# Patient Record
Sex: FEMALE | Race: BLACK OR AFRICAN AMERICAN | NOT HISPANIC OR LATINO | URBAN - METROPOLITAN AREA
[De-identification: names, ages, dates, MRNs, and addresses within clinical notes are randomized per-mention and may not be internally consistent; named-entity substitution may affect disease eponyms.]

---

## 2020-07-25 ENCOUNTER — TELEPHONE ENCOUNTER (OUTPATIENT)
Dept: URBAN - METROPOLITAN AREA CLINIC 13 | Facility: CLINIC | Age: 65
End: 2020-07-25

## 2020-07-25 RX ORDER — ESOMEPRAZOLE MAGNESIUM 40 MG
TAKE 1 CAPSULE DAILY CAPSULE,DELAYED RELEASE (ENTERIC COATED) ORAL
Qty: 30 | Refills: 6 | OUTPATIENT
End: 2017-07-06

## 2020-07-25 RX ORDER — IBUPROFEN 800 MG/1
TAKE 1 TABLET DAILY TABLET ORAL
Refills: 0 | OUTPATIENT
End: 2013-08-05

## 2020-07-25 RX ORDER — FAMOTIDINE 40 MG/1
TAKE 1 TABLET AT BEDTIME TABLET ORAL
Qty: 90 | Refills: 3 | OUTPATIENT
Start: 2018-03-23 | End: 2018-09-28

## 2020-07-25 RX ORDER — AMITRIPTYLINE HYDROCHLORIDE 10 MG/1
TAKE 1 TABLET AT BEDTIME TABLET, FILM COATED ORAL
Qty: 30 | Refills: 3 | OUTPATIENT
Start: 2018-12-31 | End: 2019-07-01

## 2020-07-25 RX ORDER — ATENOLOL 50 MG/1
TAKE 1 TABLET DAILY TABLET ORAL
Refills: 0 | OUTPATIENT
End: 2013-05-08

## 2020-07-25 RX ORDER — LIRAGLUTIDE 6 MG/ML
INJECT UNIT  EVERY 2 DAYS INJECTION, SOLUTION SUBCUTANEOUS
Refills: 0 | OUTPATIENT
End: 2018-09-28

## 2020-07-25 RX ORDER — PANTOPRAZOLE SODIUM 40 MG/1
TAKE ONE TABLET BY MOUTH TWICE DAILY 30 MINUTES BEFORE BREAKFAST AND DINNER TABLET, DELAYED RELEASE ORAL
Qty: 60 | Refills: 3 | OUTPATIENT
End: 2019-07-01

## 2020-07-25 RX ORDER — SUCRALFATE 1 G/10ML
TAKE 10 ML 4 TIMES DAILY BEFORE EACH MEAL AND AT BEDTIME SUSPENSION ORAL
Qty: 1 | Refills: 10 | OUTPATIENT
Start: 2018-12-31 | End: 2019-07-01

## 2020-07-26 ENCOUNTER — TELEPHONE ENCOUNTER (OUTPATIENT)
Dept: URBAN - METROPOLITAN AREA CLINIC 13 | Facility: CLINIC | Age: 65
End: 2020-07-26

## 2020-07-26 RX ORDER — AMITRIPTYLINE HYDROCHLORIDE 10 MG/1
TAKE 1 TABLET BY MOUTH AT BEDTIME TABLET, FILM COATED ORAL
Qty: 90 | Refills: 1 | Status: ACTIVE | COMMUNITY
Start: 2019-07-01

## 2020-07-26 RX ORDER — AMLODIPINE BESYLATE 5 MG/1
TAKE 1 TABLET DAILY AS DIRECTED TABLET ORAL
Refills: 0 | Status: ACTIVE | COMMUNITY

## 2020-07-26 RX ORDER — DEXTROSE 4 G
TAKE 1 TABLET DAILY AS NEEDED TABLET,CHEWABLE ORAL
Refills: 0 | Status: ACTIVE | COMMUNITY

## 2020-07-26 RX ORDER — HYDROCODONE BITARTRATE AND ACETAMINOPHEN 10; 325 MG/1; MG/1
TAKE 1 TABLET EVERY 4 HOURS AS NEEDED TABLET ORAL
Refills: 0 | Status: ACTIVE | COMMUNITY

## 2020-07-26 RX ORDER — TELMISARTAN AND HYDROCHLOROTHIAZIDE 80; 25 MG/1; MG/1
TAKE 1 TABLET DAILY TABLET ORAL
Refills: 0 | Status: ACTIVE | COMMUNITY

## 2020-07-26 RX ORDER — MULTIVITAMIN
TAKE 1 TABLET DAILY TABLET ORAL
Refills: 0 | Status: ACTIVE | COMMUNITY

## 2023-10-17 ENCOUNTER — OFFICE VISIT (OUTPATIENT)
Dept: URBAN - METROPOLITAN AREA CLINIC 113 | Facility: CLINIC | Age: 68
End: 2023-10-17
Payer: MEDICARE

## 2023-10-17 ENCOUNTER — LAB OUTSIDE AN ENCOUNTER (OUTPATIENT)
Dept: URBAN - METROPOLITAN AREA CLINIC 113 | Facility: CLINIC | Age: 68
End: 2023-10-17

## 2023-10-17 VITALS
HEIGHT: 64 IN | TEMPERATURE: 98 F | WEIGHT: 204 LBS | HEART RATE: 50 BPM | SYSTOLIC BLOOD PRESSURE: 143 MMHG | DIASTOLIC BLOOD PRESSURE: 77 MMHG | BODY MASS INDEX: 34.83 KG/M2

## 2023-10-17 DIAGNOSIS — K21.9 GERD WITHOUT ESOPHAGITIS: ICD-10-CM

## 2023-10-17 DIAGNOSIS — Z12.11 COLON CANCER SCREENING: ICD-10-CM

## 2023-10-17 PROBLEM — 266435005: Status: ACTIVE | Noted: 2023-10-17

## 2023-10-17 PROCEDURE — 99202 OFFICE O/P NEW SF 15 MIN: CPT | Performed by: INTERNAL MEDICINE

## 2023-10-17 RX ORDER — TELMISARTAN AND HYDROCHLOROTHIAZIDE 80; 25 MG/1; MG/1
TAKE 1 TABLET DAILY TABLET ORAL
Refills: 0 | Status: ON HOLD | COMMUNITY

## 2023-10-17 RX ORDER — FEXOFENADINE HYDROCHLORIDE 180 MG/1
1 TABLET SWALLOW WHOLE WITH WATER; DO NOT TAKE WITH FRUIT JUICES TABLET ORAL ONCE A DAY
Status: ACTIVE | COMMUNITY

## 2023-10-17 RX ORDER — SIMVASTATIN 10 MG
2 TABLETS IN THE EVENING TABLET ORAL ONCE A DAY
Status: ACTIVE | COMMUNITY

## 2023-10-17 RX ORDER — TELMISARTAN 80 MG/1
1 TABLET TABLET ORAL ONCE A DAY
Status: ACTIVE | COMMUNITY

## 2023-10-17 RX ORDER — AMLODIPINE BESYLATE 5 MG/1
TAKE 1 TABLET DAILY AS DIRECTED TABLET ORAL
Refills: 0 | Status: ACTIVE | COMMUNITY

## 2023-10-17 RX ORDER — AMITRIPTYLINE HYDROCHLORIDE 10 MG/1
TAKE 1 TABLET BY MOUTH AT BEDTIME TABLET, FILM COATED ORAL
Qty: 90 | Refills: 1 | Status: ACTIVE | COMMUNITY
Start: 2019-07-01

## 2023-10-17 RX ORDER — PANTOPRAZOLE SODIUM 40 MG/1
1 TABLET TABLET, DELAYED RELEASE ORAL ONCE A DAY
Status: ACTIVE | COMMUNITY

## 2023-10-17 RX ORDER — DEXTROSE 4 G
TAKE 1 TABLET DAILY AS NEEDED TABLET,CHEWABLE ORAL
Refills: 0 | Status: ACTIVE | COMMUNITY

## 2023-10-17 RX ORDER — GABAPENTIN 600 MG/1
1 TABLET TABLET, FILM COATED ORAL ONCE A DAY
Status: ACTIVE | COMMUNITY

## 2023-10-17 RX ORDER — MULTIVITAMIN
TAKE 1 TABLET DAILY TABLET ORAL
Refills: 0 | Status: ACTIVE | COMMUNITY

## 2023-10-17 RX ORDER — EPINEPHRINE 0.3 MG/.3ML
AS DIRECTED INJECTION INTRAMUSCULAR; SUBCUTANEOUS
Status: ACTIVE | COMMUNITY

## 2023-10-17 RX ORDER — MONTELUKAST 10 MG/1
1 TABLET TABLET, FILM COATED ORAL ONCE A DAY
Status: ACTIVE | COMMUNITY

## 2023-10-17 RX ORDER — AZELASTINE HYDROCHLORIDE 137 UG/1
1 PUFF IN EACH NOSTRIL SPRAY, METERED NASAL TWICE A DAY
Status: ACTIVE | COMMUNITY

## 2023-10-17 RX ORDER — HYDROCODONE BITARTRATE AND ACETAMINOPHEN 10; 325 MG/1; MG/1
TAKE 1 TABLET EVERY 4 HOURS AS NEEDED TABLET ORAL
Refills: 0 | Status: ACTIVE | COMMUNITY

## 2023-10-17 NOTE — HPI-TODAY'S VISIT:
The patient is a very pleasant 67-year-old female who returns after long interval absence.  The patient was initially seen in February 2013 for dysphagia and acid reflux.  She was last seen in the office in 2019 for belching and acid reflux. Last upper endoscopy was in August 2019 this revealed a small hiatal hernia but otherwise unremarkable exam.  Exam was for nausea and vomiting at that time.  Last colonoscopy was in September 2013.  This was for history of colon polyps.  Exam was without difficulty.  Patient had diverticulosis but was otherwise unremarkable exam and she was recommended a 10-year follow-up. Referring records were reviewed.  Laboratory testing from August of this year revealed a normal CBC, normal CMP other than creatinine of 1.3.  Normal TSH normal T4 normal vitamin B12 normal folate The patient states that she comes in now for her screening colonoscopy.  She states over the last 4 years since have seen her she is remained in excellent health.  She does state though recently she has had problems of low blood sugar and fainting spells and is undergoing neurological evaluation.  She states there is some concern for possible stroke or TIA.  She states so far MRI and CT scan of the head have been unremarkable.

## 2023-11-29 ENCOUNTER — TELEPHONE ENCOUNTER (OUTPATIENT)
Dept: URBAN - METROPOLITAN AREA CLINIC 113 | Facility: CLINIC | Age: 68
End: 2023-11-29

## 2024-01-16 ENCOUNTER — OUT OF OFFICE VISIT (OUTPATIENT)
Dept: URBAN - METROPOLITAN AREA SURGERY CENTER 25 | Facility: SURGERY CENTER | Age: 69
End: 2024-01-16
Payer: MEDICARE

## 2024-01-16 ENCOUNTER — CLAIMS CREATED FROM THE CLAIM WINDOW (OUTPATIENT)
Dept: URBAN - METROPOLITAN AREA CLINIC 4 | Facility: CLINIC | Age: 69
End: 2024-01-16
Payer: MEDICARE

## 2024-01-16 DIAGNOSIS — D12.5 ADENOMATOUS POLYP OF SIGMOID COLON: ICD-10-CM

## 2024-01-16 DIAGNOSIS — Z12.11 COLON CANCER SCREENING (HIGH RISK): ICD-10-CM

## 2024-01-16 DIAGNOSIS — Z12.11 COLON CANCER SCREENING: ICD-10-CM

## 2024-01-16 DIAGNOSIS — K63.89 APPENDICITIS EPIPLOICA: ICD-10-CM

## 2024-01-16 DIAGNOSIS — K63.89 OTHER SPECIFIED DISEASES OF INTESTINE: ICD-10-CM

## 2024-01-16 DIAGNOSIS — K57.30 COLON, DIVERTICULOSIS: ICD-10-CM

## 2024-01-16 PROCEDURE — 88305 TISSUE EXAM BY PATHOLOGIST: CPT | Performed by: PATHOLOGY

## 2024-01-16 PROCEDURE — 00811 ANES LWR INTST NDSC NOS: CPT | Performed by: ANESTHESIOLOGY

## 2024-01-16 PROCEDURE — 45385 COLONOSCOPY W/LESION REMOVAL: CPT | Performed by: INTERNAL MEDICINE

## 2024-01-16 PROCEDURE — G8907 PT DOC NO EVENTS ON DISCHARG: HCPCS | Performed by: INTERNAL MEDICINE

## 2024-01-16 RX ORDER — HYDROCODONE BITARTRATE AND ACETAMINOPHEN 10; 325 MG/1; MG/1
TAKE 1 TABLET EVERY 4 HOURS AS NEEDED TABLET ORAL
Refills: 0 | Status: ACTIVE | COMMUNITY

## 2024-01-16 RX ORDER — PANTOPRAZOLE SODIUM 40 MG/1
1 TABLET TABLET, DELAYED RELEASE ORAL ONCE A DAY
Status: ACTIVE | COMMUNITY

## 2024-01-16 RX ORDER — DEXTROSE 4 G
TAKE 1 TABLET DAILY AS NEEDED TABLET,CHEWABLE ORAL
Refills: 0 | Status: ACTIVE | COMMUNITY

## 2024-01-16 RX ORDER — SIMVASTATIN 10 MG
2 TABLETS IN THE EVENING TABLET ORAL ONCE A DAY
Status: ACTIVE | COMMUNITY

## 2024-01-16 RX ORDER — TELMISARTAN 80 MG/1
1 TABLET TABLET ORAL ONCE A DAY
Status: ACTIVE | COMMUNITY

## 2024-01-16 RX ORDER — FEXOFENADINE HYDROCHLORIDE 180 MG/1
1 TABLET SWALLOW WHOLE WITH WATER; DO NOT TAKE WITH FRUIT JUICES TABLET ORAL ONCE A DAY
Status: ACTIVE | COMMUNITY

## 2024-01-16 RX ORDER — TELMISARTAN AND HYDROCHLOROTHIAZIDE 80; 25 MG/1; MG/1
TAKE 1 TABLET DAILY TABLET ORAL
Refills: 0 | Status: ON HOLD | COMMUNITY

## 2024-01-16 RX ORDER — MULTIVITAMIN
TAKE 1 TABLET DAILY TABLET ORAL
Refills: 0 | Status: ACTIVE | COMMUNITY

## 2024-01-16 RX ORDER — MONTELUKAST 10 MG/1
1 TABLET TABLET, FILM COATED ORAL ONCE A DAY
Status: ACTIVE | COMMUNITY

## 2024-01-16 RX ORDER — AZELASTINE HYDROCHLORIDE 137 UG/1
1 PUFF IN EACH NOSTRIL SPRAY, METERED NASAL TWICE A DAY
Status: ACTIVE | COMMUNITY

## 2024-01-16 RX ORDER — AMLODIPINE BESYLATE 5 MG/1
TAKE 1 TABLET DAILY AS DIRECTED TABLET ORAL
Refills: 0 | Status: ACTIVE | COMMUNITY

## 2024-01-16 RX ORDER — GABAPENTIN 600 MG/1
1 TABLET TABLET, FILM COATED ORAL ONCE A DAY
Status: ACTIVE | COMMUNITY

## 2024-01-16 RX ORDER — EPINEPHRINE 0.3 MG/.3ML
AS DIRECTED INJECTION INTRAMUSCULAR; SUBCUTANEOUS
Status: ACTIVE | COMMUNITY

## 2024-01-16 RX ORDER — AMITRIPTYLINE HYDROCHLORIDE 10 MG/1
TAKE 1 TABLET BY MOUTH AT BEDTIME TABLET, FILM COATED ORAL
Qty: 90 | Refills: 1 | Status: ACTIVE | COMMUNITY
Start: 2019-07-01

## 2024-05-22 ENCOUNTER — OFFICE VISIT (OUTPATIENT)
Dept: URBAN - METROPOLITAN AREA CLINIC 113 | Facility: CLINIC | Age: 69
End: 2024-05-22
Payer: MEDICARE

## 2024-05-22 ENCOUNTER — DASHBOARD ENCOUNTERS (OUTPATIENT)
Age: 69
End: 2024-05-22

## 2024-05-22 VITALS
SYSTOLIC BLOOD PRESSURE: 145 MMHG | BODY MASS INDEX: 34.37 KG/M2 | RESPIRATION RATE: 18 BRPM | WEIGHT: 201.3 LBS | HEART RATE: 60 BPM | TEMPERATURE: 98.1 F | DIASTOLIC BLOOD PRESSURE: 56 MMHG | HEIGHT: 64 IN

## 2024-05-22 DIAGNOSIS — R19.06 EPIGASTRIC FULLNESS: ICD-10-CM

## 2024-05-22 DIAGNOSIS — R68.81 EARLY SATIETY: ICD-10-CM

## 2024-05-22 DIAGNOSIS — R42 DIZZINESS: ICD-10-CM

## 2024-05-22 DIAGNOSIS — R11.2 NAUSEA AND VOMITING, UNSPECIFIED VOMITING TYPE: ICD-10-CM

## 2024-05-22 PROCEDURE — 99214 OFFICE O/P EST MOD 30 MIN: CPT

## 2024-05-22 RX ORDER — GABAPENTIN 600 MG/1
1 TABLET TABLET, FILM COATED ORAL ONCE A DAY
Status: ON HOLD | COMMUNITY

## 2024-05-22 RX ORDER — TELMISARTAN AND HYDROCHLOROTHIAZIDE 80; 25 MG/1; MG/1
TAKE 1 TABLET DAILY TABLET ORAL
Refills: 0 | Status: ON HOLD | COMMUNITY

## 2024-05-22 RX ORDER — EPINEPHRINE 0.3 MG/.3ML
AS DIRECTED INJECTION INTRAMUSCULAR; SUBCUTANEOUS
Status: ACTIVE | COMMUNITY

## 2024-05-22 RX ORDER — TELMISARTAN 80 MG/1
1 TABLET TABLET ORAL ONCE A DAY
Status: ACTIVE | COMMUNITY

## 2024-05-22 RX ORDER — DEXTROSE 4 G
TAKE 1 TABLET DAILY AS NEEDED TABLET,CHEWABLE ORAL
Refills: 0 | Status: ACTIVE | COMMUNITY

## 2024-05-22 RX ORDER — AZELASTINE 1 MG/ML
1 PUFF IN EACH NOSTRIL SPRAY, METERED NASAL TWICE A DAY
Status: ACTIVE | COMMUNITY

## 2024-05-22 RX ORDER — AMITRIPTYLINE HYDROCHLORIDE 10 MG/1
TAKE 1 TABLET BY MOUTH AT BEDTIME TABLET, FILM COATED ORAL
Qty: 90 | Refills: 1 | Status: ACTIVE | COMMUNITY
Start: 2019-07-01

## 2024-05-22 RX ORDER — SIMVASTATIN 10 MG
2 TABLETS IN THE EVENING TABLET ORAL ONCE A DAY
Status: ACTIVE | COMMUNITY

## 2024-05-22 RX ORDER — HYDROCODONE BITARTRATE AND ACETAMINOPHEN 10; 325 MG/1; MG/1
TAKE 1 TABLET EVERY 4 HOURS AS NEEDED TABLET ORAL
Refills: 0 | Status: ON HOLD | COMMUNITY

## 2024-05-22 RX ORDER — AMLODIPINE BESYLATE 5 MG/1
TAKE 1 TABLET DAILY AS DIRECTED TABLET ORAL
Refills: 0 | Status: ACTIVE | COMMUNITY

## 2024-05-22 RX ORDER — MULTIVITAMIN
TAKE 1 TABLET DAILY TABLET ORAL
Refills: 0 | Status: ACTIVE | COMMUNITY

## 2024-05-22 RX ORDER — PANTOPRAZOLE SODIUM 40 MG/1
1 TABLET TABLET, DELAYED RELEASE ORAL ONCE A DAY
Status: ACTIVE | COMMUNITY

## 2024-05-22 RX ORDER — MONTELUKAST 10 MG/1
1 TABLET TABLET, FILM COATED ORAL ONCE A DAY
Status: ACTIVE | COMMUNITY

## 2024-05-22 RX ORDER — FEXOFENADINE HYDROCHLORIDE 180 MG/1
1 TABLET SWALLOW WHOLE WITH WATER; DO NOT TAKE WITH FRUIT JUICES TABLET ORAL ONCE A DAY
Status: ACTIVE | COMMUNITY

## 2024-07-10 ENCOUNTER — CLAIMS CREATED FROM THE CLAIM WINDOW (OUTPATIENT)
Dept: URBAN - METROPOLITAN AREA SURGERY CENTER 25 | Facility: SURGERY CENTER | Age: 69
End: 2024-07-10
Payer: MEDICARE

## 2024-07-10 DIAGNOSIS — R11.0 NAUSEA: ICD-10-CM

## 2024-07-10 DIAGNOSIS — K44.9 HIATAL HERNIA: ICD-10-CM

## 2024-07-10 DIAGNOSIS — R10.13 EPIGASTRIC PAIN: ICD-10-CM

## 2024-07-10 PROCEDURE — 43235 EGD DIAGNOSTIC BRUSH WASH: CPT | Performed by: INTERNAL MEDICINE

## 2024-07-10 PROCEDURE — 00731 ANES UPR GI NDSC PX NOS: CPT | Performed by: ANESTHESIOLOGY

## 2024-07-10 PROCEDURE — 00731 ANES UPR GI NDSC PX NOS: CPT | Performed by: ANESTHESIOLOGIST ASSISTANT

## 2024-07-10 RX ORDER — FEXOFENADINE HYDROCHLORIDE 180 MG/1
1 TABLET SWALLOW WHOLE WITH WATER; DO NOT TAKE WITH FRUIT JUICES TABLET ORAL ONCE A DAY
Status: ACTIVE | COMMUNITY

## 2024-07-10 RX ORDER — GABAPENTIN 600 MG/1
1 TABLET TABLET, FILM COATED ORAL ONCE A DAY
Status: ON HOLD | COMMUNITY

## 2024-07-10 RX ORDER — EPINEPHRINE 0.3 MG/.3ML
AS DIRECTED INJECTION INTRAMUSCULAR; SUBCUTANEOUS
Status: ACTIVE | COMMUNITY

## 2024-07-10 RX ORDER — HYDROCODONE BITARTRATE AND ACETAMINOPHEN 10; 325 MG/1; MG/1
TAKE 1 TABLET EVERY 4 HOURS AS NEEDED TABLET ORAL
Refills: 0 | Status: ON HOLD | COMMUNITY

## 2024-07-10 RX ORDER — SIMVASTATIN 10 MG
2 TABLETS IN THE EVENING TABLET ORAL ONCE A DAY
Status: ACTIVE | COMMUNITY

## 2024-07-10 RX ORDER — PANTOPRAZOLE SODIUM 40 MG/1
1 TABLET TABLET, DELAYED RELEASE ORAL ONCE A DAY
Status: ACTIVE | COMMUNITY

## 2024-07-10 RX ORDER — AZELASTINE 1 MG/ML
1 PUFF IN EACH NOSTRIL SPRAY, METERED NASAL TWICE A DAY
Status: ACTIVE | COMMUNITY

## 2024-07-10 RX ORDER — MULTIVITAMIN
TAKE 1 TABLET DAILY TABLET ORAL
Refills: 0 | Status: ACTIVE | COMMUNITY

## 2024-07-10 RX ORDER — MONTELUKAST 10 MG/1
1 TABLET TABLET, FILM COATED ORAL ONCE A DAY
Status: ACTIVE | COMMUNITY

## 2024-07-10 RX ORDER — AMLODIPINE BESYLATE 5 MG/1
TAKE 1 TABLET DAILY AS DIRECTED TABLET ORAL
Refills: 0 | Status: ACTIVE | COMMUNITY

## 2024-07-10 RX ORDER — AMITRIPTYLINE HYDROCHLORIDE 10 MG/1
TAKE 1 TABLET BY MOUTH AT BEDTIME TABLET, FILM COATED ORAL
Qty: 90 | Refills: 1 | Status: ACTIVE | COMMUNITY
Start: 2019-07-01

## 2024-07-10 RX ORDER — DEXTROSE 4 G
TAKE 1 TABLET DAILY AS NEEDED TABLET,CHEWABLE ORAL
Refills: 0 | Status: ACTIVE | COMMUNITY

## 2024-07-10 RX ORDER — TELMISARTAN AND HYDROCHLOROTHIAZIDE 80; 25 MG/1; MG/1
TAKE 1 TABLET DAILY TABLET ORAL
Refills: 0 | Status: ON HOLD | COMMUNITY

## 2024-07-10 RX ORDER — TELMISARTAN 80 MG/1
1 TABLET TABLET ORAL ONCE A DAY
Status: ACTIVE | COMMUNITY

## 2024-08-14 ENCOUNTER — OFFICE VISIT (OUTPATIENT)
Dept: URBAN - METROPOLITAN AREA CLINIC 113 | Facility: CLINIC | Age: 69
End: 2024-08-14
Payer: MEDICARE

## 2024-08-14 VITALS
HEIGHT: 64 IN | BODY MASS INDEX: 34.31 KG/M2 | TEMPERATURE: 97.9 F | SYSTOLIC BLOOD PRESSURE: 145 MMHG | WEIGHT: 201 LBS | RESPIRATION RATE: 18 BRPM | HEART RATE: 69 BPM | DIASTOLIC BLOOD PRESSURE: 82 MMHG

## 2024-08-14 DIAGNOSIS — R19.06 EPIGASTRIC FULLNESS: ICD-10-CM

## 2024-08-14 DIAGNOSIS — K21.9 GASTROESOPHAGEAL REFLUX DISEASE, UNSPECIFIED WHETHER ESOPHAGITIS PRESENT: ICD-10-CM

## 2024-08-14 DIAGNOSIS — R11.2 NAUSEA AND VOMITING, UNSPECIFIED VOMITING TYPE: ICD-10-CM

## 2024-08-14 DIAGNOSIS — R68.81 EARLY SATIETY: ICD-10-CM

## 2024-08-14 PROBLEM — 235595009: Status: ACTIVE | Noted: 2024-08-14

## 2024-08-14 PROCEDURE — 99214 OFFICE O/P EST MOD 30 MIN: CPT

## 2024-08-14 RX ORDER — MONTELUKAST 10 MG/1
1 TABLET TABLET, FILM COATED ORAL ONCE A DAY
Status: ACTIVE | COMMUNITY

## 2024-08-14 RX ORDER — DEXTROSE 4 G
TAKE 1 TABLET DAILY AS NEEDED TABLET,CHEWABLE ORAL
Refills: 0 | Status: ACTIVE | COMMUNITY

## 2024-08-14 RX ORDER — SIMVASTATIN 10 MG
2 TABLETS IN THE EVENING TABLET ORAL ONCE A DAY
Status: ACTIVE | COMMUNITY

## 2024-08-14 RX ORDER — AMLODIPINE BESYLATE 5 MG/1
TAKE 1 TABLET DAILY AS DIRECTED TABLET ORAL
Refills: 0 | Status: ACTIVE | COMMUNITY

## 2024-08-14 RX ORDER — MULTIVITAMIN
TAKE 1 TABLET DAILY TABLET ORAL
Refills: 0 | Status: ACTIVE | COMMUNITY

## 2024-08-14 RX ORDER — PANTOPRAZOLE SODIUM 40 MG/1
1 TABLET TABLET, DELAYED RELEASE ORAL TWICE DAILY
Qty: 180 | Refills: 0 | OUTPATIENT

## 2024-08-14 RX ORDER — HYDROCODONE BITARTRATE AND ACETAMINOPHEN 10; 325 MG/1; MG/1
TAKE 1 TABLET EVERY 4 HOURS AS NEEDED TABLET ORAL
Refills: 0 | Status: ON HOLD | COMMUNITY

## 2024-08-14 RX ORDER — EPINEPHRINE 0.3 MG/.3ML
AS DIRECTED INJECTION INTRAMUSCULAR; SUBCUTANEOUS
Status: ACTIVE | COMMUNITY

## 2024-08-14 RX ORDER — AZELASTINE 1 MG/ML
1 PUFF IN EACH NOSTRIL SPRAY, METERED NASAL TWICE A DAY
Status: ACTIVE | COMMUNITY

## 2024-08-14 RX ORDER — GABAPENTIN 600 MG/1
1 TABLET TABLET, FILM COATED ORAL ONCE A DAY
Status: ON HOLD | COMMUNITY

## 2024-08-14 RX ORDER — TELMISARTAN 80 MG/1
1 TABLET TABLET ORAL ONCE A DAY
Status: ACTIVE | COMMUNITY

## 2024-08-14 RX ORDER — PANTOPRAZOLE SODIUM 40 MG/1
1 TABLET TABLET, DELAYED RELEASE ORAL ONCE A DAY
Status: ACTIVE | COMMUNITY

## 2024-08-14 RX ORDER — FEXOFENADINE HYDROCHLORIDE 180 MG/1
1 TABLET SWALLOW WHOLE WITH WATER; DO NOT TAKE WITH FRUIT JUICES TABLET ORAL ONCE A DAY
Status: ACTIVE | COMMUNITY

## 2024-08-14 RX ORDER — AMITRIPTYLINE HYDROCHLORIDE 10 MG/1
TAKE 1 TABLET BY MOUTH AT BEDTIME TABLET, FILM COATED ORAL
Qty: 90 | Refills: 1 | Status: ACTIVE | COMMUNITY
Start: 2019-07-01

## 2024-08-14 RX ORDER — TELMISARTAN AND HYDROCHLOROTHIAZIDE 80; 25 MG/1; MG/1
TAKE 1 TABLET DAILY TABLET ORAL
Refills: 0 | Status: ON HOLD | COMMUNITY

## 2024-08-14 NOTE — HPI-TODAY'S VISIT:
The patient is a very pleasant 67-year-old female who returns after long interval absence.  The patient was initially seen in February 2013 for dysphagia and acid reflux.  She was last seen in the office in 2019 for belching and acid reflux. Last upper endoscopy was in August 2019 this revealed a small hiatal hernia but otherwise unremarkable exam.  Exam was for nausea and vomiting at that time.  Last colonoscopy was in September 2013.  This was for history of colon polyps.  Exam was without difficulty.  Patient had diverticulosis but was otherwise unremarkable exam and she was recommended a 10-year follow-up. Referring records were reviewed.  Laboratory testing from August of this year revealed a normal CBC, normal CMP other than creatinine of 1.3.  Normal TSH normal T4 normal vitamin B12 normal folate The patient states that she comes in now for her screening colonoscopy.  She states over the last 4 years since have seen her she is remained in excellent health.  She does state though recently she has had problems of low blood sugar and fainting spells and is undergoing neurological evaluation.  She states there is some concern for possible stroke or TIA.  She states so far MRI and CT scan of the head have been unremarkable.  Interval history, 5/22/2024: 68-year-old female presents for evaluation of GERD.  She was last seen in October 2023 for routine colon cancer screening.  We did defer for 3 to 4 months, so she may complete her neurological evaluation.  She eventually underwent colonoscopy on 1/16/2024.  This was performed without difficulty and quality bowel prep was excellent.  This was notable for sigmoid diverticulosis, a 4 mm polyp in the proximal sigmoid colon and a 3 mm polyp in the proximal sigmoid colon.  Pathology revealed prominent mucosal lymphoid aggregates.  Surveillance was recommended in 10 years.  Her main complaint today is a long history of intermittent episodes of extreme vertigo/dizziness followed by epigastric pain, chest pain, nausea and vomiting. Usually movement brings on the dizziness but not always. Her symptoms are not triggered by food or bowel movements. She has seen multiple specialties including neuro, cardiology and ENT and documented below. She feels all of her symptoms started after getting the Covid vaccine. She refuses to take any booster. Denies reflux or heartburn. She does have epigastric fullness and early satiety as well. She is not on narcotics. Denies any GLP-1 medications. No history of diabetes.  Interval history, 8/14/2024: She was recommended EGD and gastric emptying scan.  Gastric emptying scan 6/7/2024:No evidence of delayed gastric emptying.  She presented to the ED on 6/20/2024 with complaints of dizziness, worse with movement.  She had 4 prior brain MRIs.  She is followed by ENT and was scheduled for tilt table test the following week.  EGD 7/10/2024: Performed without difficulty.  Duodenum was normal.  2 cm hiatal hernia found.  Appeared to be medium volume.  Normal esophagus.  No specimens collected.  Labs 8/1/2024:Normal H/H, elevated .8, normal LFTs.  She continues to have intermittent periods of dizziness followed by nausea. She now also reports postprandial heartburn and vomiting. This contradicts her reports at her last visit when stating food did not affect her symptoms. She is on Pantoprazole 40 mg once daily.

## 2024-08-14 NOTE — HPI-OTHER HISTORIES
- negative cardiac cath 4/2021 - MOLLY negative in 2021 - multiple normal EKGs - negative carotid artery U/s 8/2023 and 9/2023 - negative head CT 9/2023 x 2 negative head MRI 8/2023 - unrevealing labs -negative ENT evaluation 12/2023

## 2024-10-09 ENCOUNTER — OFFICE VISIT (OUTPATIENT)
Dept: URBAN - METROPOLITAN AREA CLINIC 113 | Facility: CLINIC | Age: 69
End: 2024-10-09
Payer: MEDICARE

## 2024-10-09 VITALS
HEART RATE: 74 BPM | RESPIRATION RATE: 18 BRPM | TEMPERATURE: 97.7 F | WEIGHT: 194.7 LBS | HEIGHT: 64 IN | BODY MASS INDEX: 33.24 KG/M2 | SYSTOLIC BLOOD PRESSURE: 158 MMHG | DIASTOLIC BLOOD PRESSURE: 71 MMHG

## 2024-10-09 DIAGNOSIS — R11.2 NAUSEA AND VOMITING, UNSPECIFIED VOMITING TYPE: ICD-10-CM

## 2024-10-09 DIAGNOSIS — R68.81 EARLY SATIETY: ICD-10-CM

## 2024-10-09 DIAGNOSIS — K21.9 GERD WITHOUT ESOPHAGITIS: ICD-10-CM

## 2024-10-09 DIAGNOSIS — R19.06 EPIGASTRIC FULLNESS: ICD-10-CM

## 2024-10-09 PROCEDURE — 99213 OFFICE O/P EST LOW 20 MIN: CPT | Performed by: INTERNAL MEDICINE

## 2024-10-09 RX ORDER — SIMVASTATIN 10 MG
2 TABLETS IN THE EVENING TABLET ORAL ONCE A DAY
Status: ACTIVE | COMMUNITY

## 2024-10-09 RX ORDER — TELMISARTAN AND HYDROCHLOROTHIAZIDE 80; 25 MG/1; MG/1
TAKE 1 TABLET DAILY TABLET ORAL
Refills: 0 | Status: ON HOLD | COMMUNITY

## 2024-10-09 RX ORDER — AMITRIPTYLINE HYDROCHLORIDE 10 MG/1
TAKE 1 TABLET BY MOUTH AT BEDTIME TABLET, FILM COATED ORAL
Qty: 90 | Refills: 1 | Status: ACTIVE | COMMUNITY
Start: 2019-07-01

## 2024-10-09 RX ORDER — DEXTROSE 4 G
TAKE 1 TABLET DAILY AS NEEDED TABLET,CHEWABLE ORAL
Refills: 0 | Status: ACTIVE | COMMUNITY

## 2024-10-09 RX ORDER — HYDROCODONE BITARTRATE AND ACETAMINOPHEN 10; 325 MG/1; MG/1
TAKE 1 TABLET EVERY 4 HOURS AS NEEDED TABLET ORAL
Refills: 0 | Status: ON HOLD | COMMUNITY

## 2024-10-09 RX ORDER — MULTIVITAMIN
TAKE 1 TABLET DAILY TABLET ORAL
Refills: 0 | Status: ACTIVE | COMMUNITY

## 2024-10-09 RX ORDER — MONTELUKAST 10 MG/1
1 TABLET TABLET, FILM COATED ORAL ONCE A DAY
Status: ACTIVE | COMMUNITY

## 2024-10-09 RX ORDER — TELMISARTAN 80 MG/1
1 TABLET TABLET ORAL ONCE A DAY
Status: ACTIVE | COMMUNITY

## 2024-10-09 RX ORDER — AZELASTINE 1 MG/ML
1 PUFF IN EACH NOSTRIL SPRAY, METERED NASAL TWICE A DAY
Status: ACTIVE | COMMUNITY

## 2024-10-09 RX ORDER — PANTOPRAZOLE SODIUM 40 MG/1
1 TABLET TABLET, DELAYED RELEASE ORAL TWICE DAILY
Qty: 180 | Refills: 0 | Status: ACTIVE | COMMUNITY

## 2024-10-09 RX ORDER — GABAPENTIN 600 MG/1
1 TABLET TABLET, FILM COATED ORAL ONCE A DAY
Status: ON HOLD | COMMUNITY

## 2024-10-09 RX ORDER — FEXOFENADINE HYDROCHLORIDE 180 MG/1
1 TABLET SWALLOW WHOLE WITH WATER; DO NOT TAKE WITH FRUIT JUICES TABLET ORAL ONCE A DAY
Status: ACTIVE | COMMUNITY

## 2024-10-09 RX ORDER — AMLODIPINE BESYLATE 5 MG/1
TAKE 1 TABLET DAILY AS DIRECTED TABLET ORAL
Refills: 0 | Status: ACTIVE | COMMUNITY

## 2024-10-09 RX ORDER — EPINEPHRINE 0.3 MG/.3ML
AS DIRECTED INJECTION INTRAMUSCULAR; SUBCUTANEOUS
Status: ACTIVE | COMMUNITY

## 2024-10-09 NOTE — EXAM-PHYSICAL EXAM
She is alert and oriented to person place and situation no acute distress.  There is no scleral icterus.  Impression 1.  Acid reflux.  Will decrease her proton pump inhibitor to daily. 2.  Epigastric fullness.  Will try FDgard.

## 2024-10-09 NOTE — HPI-TODAY'S VISIT:
The patient is a very pleasant 67-year-old female who returns after long interval absence.  The patient was initially seen in February 2013 for dysphagia and acid reflux.  She was last seen in the office in 2019 for belching and acid reflux. Last upper endoscopy was in August 2019 this revealed a small hiatal hernia but otherwise unremarkable exam.  Exam was for nausea and vomiting at that time.  Last colonoscopy was in September 2013.  This was for history of colon polyps.  Exam was without difficulty.  Patient had diverticulosis but was otherwise unremarkable exam and she was recommended a 10-year follow-up. Referring records were reviewed.  Laboratory testing from August of this year revealed a normal CBC, normal CMP other than creatinine of 1.3.  Normal TSH normal T4 normal vitamin B12 normal folate The patient states that she comes in now for her screening colonoscopy.  She states over the last 4 years since have seen her she is remained in excellent health.  She does state though recently she has had problems of low blood sugar and fainting spells and is undergoing neurological evaluation.  She states there is some concern for possible stroke or TIA.  She states so far MRI and CT scan of the head have been unremarkable.  Interval history, 5/22/2024: 68-year-old female presents for evaluation of GERD.  She was last seen in October 2023 for routine colon cancer screening.  We did defer for 3 to 4 months, so she may complete her neurological evaluation.  She eventually underwent colonoscopy on 1/16/2024.  This was performed without difficulty and quality bowel prep was excellent.  This was notable for sigmoid diverticulosis, a 4 mm polyp in the proximal sigmoid colon and a 3 mm polyp in the proximal sigmoid colon.  Pathology revealed prominent mucosal lymphoid aggregates.  Surveillance was recommended in 10 years.  Her main complaint today is a long history of intermittent episodes of extreme vertigo/dizziness followed by epigastric pain, chest pain, nausea and vomiting. Usually movement brings on the dizziness but not always. Her symptoms are not triggered by food or bowel movements. She has seen multiple specialties including neuro, cardiology and ENT and documented below. She feels all of her symptoms started after getting the Covid vaccine. She refuses to take any booster. Denies reflux or heartburn. She does have epigastric fullness and early satiety as well. She is not on narcotics. Denies any GLP-1 medications. No history of diabetes.  Interval history, 8/14/2024: She was recommended EGD and gastric emptying scan.  Gastric emptying scan 6/7/2024:No evidence of delayed gastric emptying.  She presented to the ED on 6/20/2024 with complaints of dizziness, worse with movement.  She had 4 prior brain MRIs.  She is followed by ENT and was scheduled for tilt table test the following week.  EGD 7/10/2024: Performed without difficulty.  Duodenum was normal.  2 cm hiatal hernia found.  Appeared to be medium volume.  Normal esophagus.  No specimens collected.  Labs 8/1/2024:Normal H/H, elevated .8, normal LFTs.  She continues to have intermittent periods of dizziness followed by nausea. She now also reports postprandial heartburn and vomiting. This contradicts her reports at her last visit when stating food did not affect her symptoms. She is on Pantoprazole 40 mg once daily. Interval history, 10/9/2024.  Laboratory testing from August 1 of this year revealed a normal CBC except for an elevated MCV at 100.8.  Hemoglobin A1c was 5.0.  CMP was unremarkable. The patient states that increasing the proton pump inhibitor did not help her symptoms.  She states her symptoms once again are a postprandial fullness as if things are not "digesting".  She is still under a great deal of stress due to her 's dementia issues which is putting more stress upon her.

## 2024-11-11 ENCOUNTER — OUT OF OFFICE VISIT (OUTPATIENT)
Dept: URBAN - METROPOLITAN AREA SURGERY CENTER 25 | Facility: SURGERY CENTER | Age: 69
End: 2024-11-11